# Patient Record
Sex: FEMALE | Race: WHITE | NOT HISPANIC OR LATINO
[De-identification: names, ages, dates, MRNs, and addresses within clinical notes are randomized per-mention and may not be internally consistent; named-entity substitution may affect disease eponyms.]

---

## 2021-07-20 ENCOUNTER — NON-APPOINTMENT (OUTPATIENT)
Age: 56
End: 2021-07-20

## 2021-07-20 ENCOUNTER — APPOINTMENT (OUTPATIENT)
Dept: INTERNAL MEDICINE | Facility: CLINIC | Age: 56
End: 2021-07-20
Payer: COMMERCIAL

## 2021-07-20 VITALS
HEART RATE: 55 BPM | WEIGHT: 191 LBS | OXYGEN SATURATION: 98 % | HEIGHT: 65 IN | TEMPERATURE: 98 F | BODY MASS INDEX: 31.82 KG/M2 | DIASTOLIC BLOOD PRESSURE: 70 MMHG | SYSTOLIC BLOOD PRESSURE: 116 MMHG

## 2021-07-20 DIAGNOSIS — Z78.9 OTHER SPECIFIED HEALTH STATUS: ICD-10-CM

## 2021-07-20 DIAGNOSIS — I46.9 CARDIAC ARREST, CAUSE UNSPECIFIED: ICD-10-CM

## 2021-07-20 DIAGNOSIS — Z83.3 FAMILY HISTORY OF DIABETES MELLITUS: ICD-10-CM

## 2021-07-20 DIAGNOSIS — Z81.8 FAMILY HISTORY OF OTHER MENTAL AND BEHAVIORAL DISORDERS: ICD-10-CM

## 2021-07-20 DIAGNOSIS — Z87.898 PERSONAL HISTORY OF OTHER SPECIFIED CONDITIONS: ICD-10-CM

## 2021-07-20 DIAGNOSIS — R09.89 OTHER SPECIFIED SYMPTOMS AND SIGNS INVOLVING THE CIRCULATORY AND RESPIRATORY SYSTEMS: ICD-10-CM

## 2021-07-20 PROCEDURE — 93000 ELECTROCARDIOGRAM COMPLETE: CPT

## 2021-07-20 PROCEDURE — 99204 OFFICE O/P NEW MOD 45 MIN: CPT | Mod: 25

## 2021-07-22 ENCOUNTER — TRANSCRIPTION ENCOUNTER (OUTPATIENT)
Age: 56
End: 2021-07-22

## 2021-07-22 ENCOUNTER — APPOINTMENT (OUTPATIENT)
Dept: CARDIOLOGY | Facility: CLINIC | Age: 56
End: 2021-07-22
Payer: COMMERCIAL

## 2021-07-22 ENCOUNTER — NON-APPOINTMENT (OUTPATIENT)
Age: 56
End: 2021-07-22

## 2021-07-22 VITALS
HEIGHT: 65 IN | DIASTOLIC BLOOD PRESSURE: 78 MMHG | WEIGHT: 191 LBS | TEMPERATURE: 98.1 F | BODY MASS INDEX: 31.82 KG/M2 | HEART RATE: 56 BPM | SYSTOLIC BLOOD PRESSURE: 119 MMHG | OXYGEN SATURATION: 98 %

## 2021-07-22 PROCEDURE — 99204 OFFICE O/P NEW MOD 45 MIN: CPT

## 2021-07-22 PROCEDURE — 93000 ELECTROCARDIOGRAM COMPLETE: CPT

## 2021-07-22 NOTE — HISTORY OF PRESENT ILLNESS
[FreeTextEntry1] : 55F with CAD/MI/Cardiac arrest presents to establish cardiovascular care\par Sent in by PMD: Dr Jerrica Eckert	\par prev cardiologist: Dr Ambrose fisher [place], now out of network\par \par pt looking to switch providers.\par pt endorses an episode 9/24205, was experiencing CP, states she collapsed at home, doesn’t remember anything, states she was in the ICU at [facility], intubated for 6 days in the CCU, states she received multiple stents, 2 to the LAD. states the next thing she knows she was being discharged from the hospital.\par states she has been following regularly with her prev cardiologist, last tte was 1/21, showing "normal EF"\par \par pt states her brother had a cardiac arrest, MI and passed away.\par \par pt overall feeling well lately, denies CP, SOB at rest or on on exertion. pt denies palpitations, dizziness, syncope, LE edema, orthopnea\par \par \par \par Exercise: was previously going to the gym multiple times a week, recently broke her foot and has been recovering. \par Diet: overall tries to eat healthy. \par Prior cardiac workup: see above (will try to get records)\par last echo 1/21. last stress test was several years ago.\par Recent labs: 7/21: hgb 13.8 tot chol 149 tg 142 hdl 64 ldl 57 Cr 0.88 gfr 73 tsh 3.08 a1c 5.3 \par \par EKG: SR 59, anterior Q waves. 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 Currently post menopause\par Sx hx: none\par Fam hx: B: CAD/MI\par Social hx: lives in [place] with  and son. works as  for a commercial property.  quit TOB 2016 (40 pk yr hx. daily wine. marijuana once a week. \par Meds: asa lipitor 80 coreg 3.125 zetia 10\par Allergies: nkda\par

## 2021-07-22 NOTE — DISCUSSION/SUMMARY
[FreeTextEntry1] : 55F with CAD, hx of MI/Cardiac arrest presents to establish CV care\par \par 1. CAD\par -will try to obtain prev records, pt states normal LVEF\par -cont asa, statin\par -cont coreg\par -pt feeling well, without symptoms at rest or on exertion, euvolemic on exam\par -f/u 6 months unless requires sooner. \par -will discuss plan with pt following review of past med records/testing

## 2021-07-22 NOTE — PHYSICAL EXAM
[Well Developed] : well developed [Well Nourished] : well nourished [No Acute Distress] : no acute distress [Normal Venous Pressure] : normal venous pressure [Normal S1, S2] : normal S1, S2 [No Murmur] : no murmur [Clear Lung Fields] : clear lung fields [Good Air Entry] : good air entry [No Respiratory Distress] : no respiratory distress  [Soft] : abdomen soft [Non Tender] : non-tender [Normal Gait] : normal gait [No Edema] : no edema [Moves all extremities] : moves all extremities [No Focal Deficits] : no focal deficits [Alert and Oriented] : alert and oriented

## 2021-07-25 ENCOUNTER — NON-APPOINTMENT (OUTPATIENT)
Age: 56
End: 2021-07-25

## 2021-07-25 NOTE — HISTORY OF PRESENT ILLNESS
[FreeTextEntry8] : 54 y/o female patient is new to the office presents today to establish care:\par - f/u HTN/hyperlipidemia/CAD s/p stent  - complaint with current medications, previously following with cardiology no longer takes insurance, needs referral for new cardiologist, currently denies any CP/SOB/palpitations/edema, check labs today

## 2021-07-27 ENCOUNTER — TRANSCRIPTION ENCOUNTER (OUTPATIENT)
Age: 56
End: 2021-07-27

## 2021-07-27 LAB
25(OH)D3 SERPL-MCNC: 55.6 NG/ML
ALBUMIN SERPL ELPH-MCNC: 4.6 G/DL
ALP BLD-CCNC: 41 U/L
ALT SERPL-CCNC: 32 U/L
ANION GAP SERPL CALC-SCNC: 9 MMOL/L
APPEARANCE: ABNORMAL
AST SERPL-CCNC: 35 U/L
BACTERIA: NEGATIVE
BASOPHILS # BLD AUTO: 0.07 K/UL
BASOPHILS NFR BLD AUTO: 1 %
BILIRUB SERPL-MCNC: 0.4 MG/DL
BILIRUBIN URINE: NEGATIVE
BLOOD URINE: NEGATIVE
BUN SERPL-MCNC: 9 MG/DL
CALCIUM SERPL-MCNC: 10.1 MG/DL
CHLORIDE SERPL-SCNC: 102 MMOL/L
CHOLEST SERPL-MCNC: 149 MG/DL
CO2 SERPL-SCNC: 27 MMOL/L
COLOR: YELLOW
CREAT SERPL-MCNC: 0.88 MG/DL
CREAT SPEC-SCNC: 87 MG/DL
EOSINOPHIL # BLD AUTO: 0.1 K/UL
EOSINOPHIL NFR BLD AUTO: 1.4 %
ESTIMATED AVERAGE GLUCOSE: 105 MG/DL
GLUCOSE QUALITATIVE U: NEGATIVE
GLUCOSE SERPL-MCNC: 94 MG/DL
HBA1C MFR BLD HPLC: 5.3 %
HCT VFR BLD CALC: 39.9 %
HDLC SERPL-MCNC: 64 MG/DL
HGB BLD-MCNC: 13.8 G/DL
HYALINE CASTS: 0 /LPF
IMM GRANULOCYTES NFR BLD AUTO: 0.1 %
KETONES URINE: NEGATIVE
LDLC SERPL CALC-MCNC: 57 MG/DL
LEUKOCYTE ESTERASE URINE: NEGATIVE
LYMPHOCYTES # BLD AUTO: 2.13 K/UL
LYMPHOCYTES NFR BLD AUTO: 30.6 %
MAGNESIUM SERPL-MCNC: 2 MG/DL
MAN DIFF?: NORMAL
MCHC RBC-ENTMCNC: 32.4 PG
MCHC RBC-ENTMCNC: 34.6 GM/DL
MCV RBC AUTO: 93.7 FL
MICROALBUMIN 24H UR DL<=1MG/L-MCNC: <1.2 MG/DL
MICROALBUMIN/CREAT 24H UR-RTO: NORMAL MG/G
MICROSCOPIC-UA: NORMAL
MONOCYTES # BLD AUTO: 0.73 K/UL
MONOCYTES NFR BLD AUTO: 10.5 %
NEUTROPHILS # BLD AUTO: 3.91 K/UL
NEUTROPHILS NFR BLD AUTO: 56.4 %
NITRITE URINE: NEGATIVE
NONHDLC SERPL-MCNC: 86 MG/DL
PH URINE: 7
PLATELET # BLD AUTO: 270 K/UL
POTASSIUM SERPL-SCNC: 4.9 MMOL/L
PROT SERPL-MCNC: 6.8 G/DL
PROTEIN URINE: NEGATIVE
RBC # BLD: 4.26 M/UL
RBC # FLD: 13.8 %
RED BLOOD CELLS URINE: 1 /HPF
SODIUM SERPL-SCNC: 138 MMOL/L
SPECIFIC GRAVITY URINE: 1.01
SQUAMOUS EPITHELIAL CELLS: 0 /HPF
TRIGL SERPL-MCNC: 142 MG/DL
TSH SERPL-ACNC: 3.08 UIU/ML
UROBILINOGEN URINE: NORMAL
VIT B12 SERPL-MCNC: >2000 PG/ML
WBC # FLD AUTO: 6.95 K/UL
WHITE BLOOD CELLS URINE: 1 /HPF

## 2021-12-06 ENCOUNTER — NON-APPOINTMENT (OUTPATIENT)
Age: 56
End: 2021-12-06

## 2021-12-06 ENCOUNTER — APPOINTMENT (OUTPATIENT)
Dept: CARDIOLOGY | Facility: CLINIC | Age: 56
End: 2021-12-06
Payer: COMMERCIAL

## 2021-12-06 VITALS
OXYGEN SATURATION: 97 % | SYSTOLIC BLOOD PRESSURE: 106 MMHG | BODY MASS INDEX: 31.65 KG/M2 | HEART RATE: 69 BPM | HEIGHT: 65 IN | TEMPERATURE: 98.7 F | WEIGHT: 190 LBS | RESPIRATION RATE: 16 BRPM | DIASTOLIC BLOOD PRESSURE: 70 MMHG

## 2021-12-06 PROCEDURE — 93000 ELECTROCARDIOGRAM COMPLETE: CPT

## 2021-12-06 PROCEDURE — 99215 OFFICE O/P EST HI 40 MIN: CPT

## 2021-12-06 NOTE — DISCUSSION/SUMMARY
[FreeTextEntry1] : 56F with CAD/MI/Cardiac arrest presents for f/u. \par \par 55F with CAD, hx of MI/Cardiac arrest presents to establish CV care\par \par 1. CAD\par -no cp or sob at rest or on exertion\par -cont asa, statin\par -cont coreg\par -pt feeling well, without symptoms at rest or on exertion, euvolemic on exam\par -will check tte to eval EF\par \par 2. cataract procedure\par -pt states she can walk multiple blocks without cp or sob\par -no recent syncope (prev hx and details of cardiac arrest unclear)\par -no murmurs on exam, pt grossly euvolemic. \par -pt is intermediate risk for low risk procedure.\par -further comments pending repeat echo.\par \par f/u 6 months unless requires sooner.

## 2021-12-06 NOTE — HISTORY OF PRESENT ILLNESS
[FreeTextEntry1] : 56F with CAD/MI/Cardiac arrest presents for f/u. \par Sent in by PMD: Dr Jerrica Eckert	\par prev cardiologist: Dr Ambrose fisher [place], now out of network\par previosuly,\par pt last seen in cardiology for an initial eval 7/21 following an episode 9/24/20, was experiencing CP, states she collapsed at home, doesn’t remember anything, states she was in the ICU at [facility], intubated for 6 days in the CCU, states she received multiple stents, 2 to the LAD. states the next thing she knows she was being discharged from the hospital.\par states she has been following regularly with her prev cardiologist, last tte was 1/21, showing "normal EF"\par pt states her brother had a cardiac arrest, MI and passed away.\par \par At last appt, pt was feeling well overall. no issues. OSH records showing preserved LVEF. \par pt now presents for follow up.\par today,\par \par pt overall feeling well, denies cp sob, at rest or on exertion. pt denies palpitations, dizziness, syncope, LE edema, orthopnea. \par \par pt planning on cataract surgery. \par pt states she can walk multiple blocks without cp or sob\par no recent syncope\par no murmurs on exam, pt grossly euvoemic. \par \par \par \par Exercise: trying to start again. \par Diet: overall tries to eat healthy. \par Prior cardiac workup: see above (will try to get records)\par last echo 1/21. last stress test was several years ago.\par Recent labs: 7/21: hgb 13.8 tot chol 149 tg 142 hdl 64 ldl 57 Cr 0.88 gfr 73 tsh 3.08 a1c 5.3 \par \par EKG: SR 60. ant Q waves. 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 Currently post menopause\par Sx hx: none\par Fam hx: B: CAD/MI\par Social hx: lives in [place] with  and son. works as  for a commercial property. quit TOB 2016 (40 pk yr hx. daily wine. marijuana once a week. \par Meds: asa lipitor 80 coreg 3.125 zetia 10, vitamins\par Allergies: nkda\par

## 2021-12-22 ENCOUNTER — APPOINTMENT (OUTPATIENT)
Dept: INTERNAL MEDICINE | Facility: CLINIC | Age: 56
End: 2021-12-22
Payer: COMMERCIAL

## 2021-12-22 VITALS
SYSTOLIC BLOOD PRESSURE: 125 MMHG | DIASTOLIC BLOOD PRESSURE: 79 MMHG | RESPIRATION RATE: 16 BRPM | HEART RATE: 71 BPM | OXYGEN SATURATION: 97 % | WEIGHT: 191.6 LBS | HEIGHT: 65 IN | BODY MASS INDEX: 31.92 KG/M2 | TEMPERATURE: 98.2 F

## 2021-12-22 PROCEDURE — 99214 OFFICE O/P EST MOD 30 MIN: CPT

## 2021-12-30 NOTE — HISTORY OF PRESENT ILLNESS
[Coronary Artery Disease] : coronary artery disease [No Pertinent Pulmonary History] : no history of asthma, COPD, sleep apnea, or smoking [No Adverse Anesthesia Reaction] : no adverse anesthesia reaction in self or family member [(Patient denies any chest pain, claudication, dyspnea on exertion, orthopnea, palpitations or syncope)] : Patient denies any chest pain, claudication, dyspnea on exertion, orthopnea, palpitations or syncope [Good (7-10 METs)] : Good (7-10 METs) [Aortic Stenosis] : no aortic stenosis [Atrial Fibrillation] : no atrial fibrillation [Recent Myocardial Infarction] : no recent myocardial infarction [Implantable Device/Pacemaker] : no implantable device/pacemaker [Chronic Anticoagulation] : no chronic anticoagulation [Chronic Kidney Disease] : no chronic kidney disease [Diabetes] : no diabetes [FreeTextEntry1] : Left Cataract Repair  [FreeTextEntry2] : 1/4/2022 [FreeTextEntry3] : Dr. Grabiel Xiong  [FreeTextEntry4] : 55 y/o female patient presents today:\par - Pre-operative clearance - patient to undergo left cataract extraction with insertion of intraocular lens on 1/4/2022 by Dr. Xiong and [facility]

## 2022-01-25 ENCOUNTER — APPOINTMENT (OUTPATIENT)
Dept: CARDIOLOGY | Facility: CLINIC | Age: 57
End: 2022-01-25

## 2022-01-26 ENCOUNTER — APPOINTMENT (OUTPATIENT)
Dept: CARDIOLOGY | Facility: CLINIC | Age: 57
End: 2022-01-26
Payer: COMMERCIAL

## 2022-01-26 PROCEDURE — 93306 TTE W/DOPPLER COMPLETE: CPT

## 2022-03-17 DIAGNOSIS — K21.9 GASTRO-ESOPHAGEAL REFLUX DISEASE W/OUT ESOPHAGITIS: ICD-10-CM

## 2022-07-01 ENCOUNTER — RX RENEWAL (OUTPATIENT)
Age: 57
End: 2022-07-01

## 2022-07-17 ENCOUNTER — RX RENEWAL (OUTPATIENT)
Age: 57
End: 2022-07-17

## 2022-07-21 ENCOUNTER — APPOINTMENT (OUTPATIENT)
Dept: NEUROLOGY | Facility: CLINIC | Age: 57
End: 2022-07-21

## 2022-07-27 ENCOUNTER — APPOINTMENT (OUTPATIENT)
Dept: INTERNAL MEDICINE | Facility: CLINIC | Age: 57
End: 2022-07-27
Payer: COMMERCIAL

## 2022-07-27 VITALS
TEMPERATURE: 97.8 F | HEART RATE: 66 BPM | OXYGEN SATURATION: 98 % | WEIGHT: 189 LBS | RESPIRATION RATE: 16 BRPM | DIASTOLIC BLOOD PRESSURE: 70 MMHG | SYSTOLIC BLOOD PRESSURE: 100 MMHG | BODY MASS INDEX: 31.49 KG/M2 | HEIGHT: 65 IN

## 2022-07-27 DIAGNOSIS — G89.29 PAIN IN RIGHT SHOULDER: ICD-10-CM

## 2022-07-27 DIAGNOSIS — D22.9 MELANOCYTIC NEVI, UNSPECIFIED: ICD-10-CM

## 2022-07-27 DIAGNOSIS — Z01.818 ENCOUNTER FOR OTHER PREPROCEDURAL EXAMINATION: ICD-10-CM

## 2022-07-27 DIAGNOSIS — M25.511 PAIN IN RIGHT SHOULDER: ICD-10-CM

## 2022-07-27 DIAGNOSIS — H26.9 UNSPECIFIED CATARACT: ICD-10-CM

## 2022-07-27 DIAGNOSIS — M25.512 PAIN IN RIGHT SHOULDER: ICD-10-CM

## 2022-07-27 PROCEDURE — 36415 COLL VENOUS BLD VENIPUNCTURE: CPT

## 2022-07-27 PROCEDURE — G0444 DEPRESSION SCREEN ANNUAL: CPT | Mod: 59

## 2022-07-27 PROCEDURE — 99396 PREV VISIT EST AGE 40-64: CPT | Mod: 25

## 2022-08-01 PROBLEM — D22.9 MULTIPLE NEVI: Status: ACTIVE | Noted: 2022-07-27

## 2022-08-01 RX ORDER — METHYLPREDNISOLONE 4 MG/1
4 TABLET ORAL
Qty: 21 | Refills: 0 | Status: DISCONTINUED | COMMUNITY
Start: 2022-04-12 | End: 2022-08-01

## 2022-08-01 NOTE — HEALTH RISK ASSESSMENT
[Former] : Former [20 or more] : 20 or more [Yes] : Yes [No] : In the past 12 months have you used drugs other than those required for medical reasons? No [0] : 2) Feeling down, depressed, or hopeless: Not at all (0) [PHQ-2 Negative - No further assessment needed] : PHQ-2 Negative - No further assessment needed [I have developed a follow-up plan documented below in the note.] : I have developed a follow-up plan documented below in the note. [de-identified] : 1.5 ppd x 38 years, QUIT age 50 [[id number]] : 0 [Patient reported mammogram was normal] : Patient reported mammogram was normal [Patient reported PAP Smear was normal] : Patient reported PAP Smear was normal [Patient reported bone density results were normal] : Patient reported bone density results were normal [Patient reported colonoscopy was normal] : Patient reported colonoscopy was normal [Change in mental status noted] : No change in mental status noted [None] : None [With Family] : lives with family [Employed] : employed [] :  [Sexually Active] : sexually active [High Risk Behavior] : no high risk behavior [Feels Safe at Home] : Feels safe at home [Reports changes in hearing] : Reports no changes in hearing [Reports changes in vision] : Reports no changes in vision [Reports changes in dental health] : Reports no changes in dental health [MammogramDate] : 2020 [PapSmearDate] : 2020 [BoneDensityDate] : 2020 [ColonoscopyDate] : 2018

## 2022-08-01 NOTE — HISTORY OF PRESENT ILLNESS
[de-identified] : 57 y/o female patient presents today for annual CPE/fasting labs\par - HCM: - PAP - 2020, mammogram - 2020 WNL, colonoscopy - 2018 WNL repeat in 5 years, DEXA - 2020 normal\par - f/u HTN/CAD/hyperlipidemia/hx MI/s/p stent - BP stable, following with cardiology, denies any CP/SOB/Palpitations/edema\par

## 2022-08-02 ENCOUNTER — APPOINTMENT (OUTPATIENT)
Dept: CARDIOLOGY | Facility: CLINIC | Age: 57
End: 2022-08-02
Payer: COMMERCIAL

## 2022-08-02 ENCOUNTER — NON-APPOINTMENT (OUTPATIENT)
Age: 57
End: 2022-08-02

## 2022-08-02 VITALS
HEART RATE: 63 BPM | HEIGHT: 65 IN | SYSTOLIC BLOOD PRESSURE: 108 MMHG | DIASTOLIC BLOOD PRESSURE: 80 MMHG | RESPIRATION RATE: 16 BRPM | WEIGHT: 187 LBS | OXYGEN SATURATION: 98 % | BODY MASS INDEX: 31.16 KG/M2 | TEMPERATURE: 97 F

## 2022-08-02 PROCEDURE — 99215 OFFICE O/P EST HI 40 MIN: CPT | Mod: 25

## 2022-08-02 PROCEDURE — 93000 ELECTROCARDIOGRAM COMPLETE: CPT

## 2022-08-02 PROCEDURE — 99406 BEHAV CHNG SMOKING 3-10 MIN: CPT | Mod: 25

## 2022-08-02 NOTE — HISTORY OF PRESENT ILLNESS
[FreeTextEntry1] : 56F with CAD/MI/Cardiac arrest presents for f/u. \par PMD: Dr Jerrica Eckert	\par prev cardiologist: Dr Ambrose fisher [place], now out of network\par \par previously,\par pt last seen in cardiology for an initial eval 7/21 following an episode 9/24/20, was experiencing CP, states she collapsed at home, doesn’t remember anything, states she was in the ICU at [facility], intubated for 6 days in the CCU, states she received multiple stents, 2 to the LAD. states the next thing she knows she was being discharged from the hospital.  pt reported previous normal echos at outpt primary cards (most recent 1/21)\par \par pt last seen 12/2021, at that time feeling well. repeat tte  1/2022, showing normal LV systolic function. normal RV. aortic root 4.4 cm. \par \par pt now presents for follow up.\par today,\par \par pt overall feeling well, no issues. walking daily 2 miles. denies cp or sob at rest or on exertion. no recent hospitalizations.  pt denies palpitations, dizziness, syncope, LE edema, orthopnea. \par \par pt had cataract surgery, tolerated procedure without cv issues.\par \par Exercise: walking daily\par Diet: overall tries to eat healthy. \par Prior cardiac workup: see above (will try to get records)\par last echo 1/22: aorta 4.4 cm\par Recent labs: 7/21: hgb 13.8 tot chol 149 tg 142 hdl 64 ldl 57 Cr 0.88 gfr 73 tsh 3.08 a1c 5.3 \par \par EKG: SR 60. ant Q waves. 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 Currently post menopause\par Sx hx: none\par Fam hx: B: CAD/MI/SCD\par Social hx: lives in [place] with  and son. works as  for a commercial property. quit TOB 2016 (40 pk yr hx. daily wine. marijuana once a week. \par Meds: asa lipitor 80 coreg 3.125 zetia 10, vitamins\par Allergies: nkda\par

## 2022-08-02 NOTE — DISCUSSION/SUMMARY
[FreeTextEntry1] : 56F with CAD/MI/Cardiac arrest/PCI, dilated aorta presents for f/u. \par \par \par 1. CAD\par -no cp or sob at rest or on exertion\par -cont asa, statin, coreg\par -pt feeling well, without symptoms at rest or on exertion, euvolemic on exam\par \par \par 3. dilated aorta\par -4.4 cm at last echo\par -will repeat echo today to assess for interval change\par -bp controlled\par -pt advised to stop smoking marijuana\par \par f/u 6 months unless requires sooner. \par \par  \par

## 2022-08-02 NOTE — COUNSELING
[Yes] : Risk of tobacco use and health benefits of smoking cessation discussed: Yes [Cessation strategies including cessation program discussed] : Cessation strategies including cessation program discussed [Use of nicotine replacement therapies and other medications discussed] : Use of nicotine replacement therapies and other medications discussed [Encouraged to pick a quit date and identify support needed to quit] : Encouraged to pick a quit date and identify support needed to quit [FreeTextEntry1] : 5

## 2022-08-23 ENCOUNTER — APPOINTMENT (OUTPATIENT)
Dept: CARDIOLOGY | Facility: CLINIC | Age: 57
End: 2022-08-23
Payer: COMMERCIAL

## 2022-08-23 PROCEDURE — 93306 TTE W/DOPPLER COMPLETE: CPT

## 2022-11-06 LAB
25(OH)D3 SERPL-MCNC: 72.2 NG/ML
ALBUMIN SERPL ELPH-MCNC: 5 G/DL
ALP BLD-CCNC: 45 U/L
ALT SERPL-CCNC: 35 U/L
ANION GAP SERPL CALC-SCNC: 13 MMOL/L
APPEARANCE: CLEAR
AST SERPL-CCNC: 36 U/L
BACTERIA: NEGATIVE
BASOPHILS # BLD AUTO: 0.05 K/UL
BASOPHILS NFR BLD AUTO: 0.8 %
BILIRUB SERPL-MCNC: 0.9 MG/DL
BILIRUBIN URINE: NEGATIVE
BLOOD URINE: NEGATIVE
BUN SERPL-MCNC: 15 MG/DL
CALCIUM SERPL-MCNC: 10 MG/DL
CHLORIDE SERPL-SCNC: 102 MMOL/L
CHOLEST SERPL-MCNC: 151 MG/DL
CO2 SERPL-SCNC: 27 MMOL/L
COLOR: COLORLESS
CREAT SERPL-MCNC: 0.93 MG/DL
CREAT SPEC-SCNC: 20 MG/DL
EGFR: 71 ML/MIN/1.73M2
EOSINOPHIL # BLD AUTO: 0.12 K/UL
EOSINOPHIL NFR BLD AUTO: 2 %
ESTIMATED AVERAGE GLUCOSE: 111 MG/DL
GLUCOSE QUALITATIVE U: NEGATIVE
GLUCOSE SERPL-MCNC: 101 MG/DL
HBA1C MFR BLD HPLC: 5.5 %
HCT VFR BLD CALC: 44 %
HDLC SERPL-MCNC: 63 MG/DL
HGB BLD-MCNC: 14.1 G/DL
HYALINE CASTS: 0 /LPF
IMM GRANULOCYTES NFR BLD AUTO: 0.2 %
IRON SATN MFR SERPL: 30 %
IRON SERPL-MCNC: 103 UG/DL
KETONES URINE: NEGATIVE
LDLC SERPL CALC-MCNC: 72 MG/DL
LEUKOCYTE ESTERASE URINE: NEGATIVE
LYMPHOCYTES # BLD AUTO: 1.9 K/UL
LYMPHOCYTES NFR BLD AUTO: 31.9 %
MAGNESIUM SERPL-MCNC: 2.2 MG/DL
MAN DIFF?: NORMAL
MCHC RBC-ENTMCNC: 31.5 PG
MCHC RBC-ENTMCNC: 32 GM/DL
MCV RBC AUTO: 98.4 FL
MICROALBUMIN 24H UR DL<=1MG/L-MCNC: <1.2 MG/DL
MICROALBUMIN/CREAT 24H UR-RTO: NORMAL MG/G
MICROSCOPIC-UA: NORMAL
MONOCYTES # BLD AUTO: 0.53 K/UL
MONOCYTES NFR BLD AUTO: 8.9 %
NEUTROPHILS # BLD AUTO: 3.35 K/UL
NEUTROPHILS NFR BLD AUTO: 56.2 %
NITRITE URINE: NEGATIVE
NONHDLC SERPL-MCNC: 88 MG/DL
PH URINE: 7
PLATELET # BLD AUTO: 245 K/UL
POTASSIUM SERPL-SCNC: 4.6 MMOL/L
PROT SERPL-MCNC: 7.4 G/DL
PROTEIN URINE: NEGATIVE
RBC # BLD: 4.47 M/UL
RBC # FLD: 14.1 %
RED BLOOD CELLS URINE: 0 /HPF
SODIUM SERPL-SCNC: 142 MMOL/L
SPECIFIC GRAVITY URINE: 1.01
SQUAMOUS EPITHELIAL CELLS: 0 /HPF
TIBC SERPL-MCNC: 344 UG/DL
TRIGL SERPL-MCNC: 77 MG/DL
TSH SERPL-ACNC: 1.47 UIU/ML
UIBC SERPL-MCNC: 241 UG/DL
UROBILINOGEN URINE: NORMAL
WBC # FLD AUTO: 5.96 K/UL
WHITE BLOOD CELLS URINE: 0 /HPF

## 2022-11-21 ENCOUNTER — RX RENEWAL (OUTPATIENT)
Age: 57
End: 2022-11-21

## 2022-12-12 ENCOUNTER — RX RENEWAL (OUTPATIENT)
Age: 57
End: 2022-12-12

## 2023-01-30 ENCOUNTER — RX RENEWAL (OUTPATIENT)
Age: 58
End: 2023-01-30

## 2023-02-08 ENCOUNTER — NON-APPOINTMENT (OUTPATIENT)
Age: 58
End: 2023-02-08

## 2023-02-08 ENCOUNTER — APPOINTMENT (OUTPATIENT)
Dept: CARDIOLOGY | Facility: CLINIC | Age: 58
End: 2023-02-08
Payer: COMMERCIAL

## 2023-02-08 VITALS
BODY MASS INDEX: 33.12 KG/M2 | SYSTOLIC BLOOD PRESSURE: 120 MMHG | HEART RATE: 62 BPM | HEIGHT: 64 IN | WEIGHT: 194 LBS | TEMPERATURE: 98 F | OXYGEN SATURATION: 98 % | DIASTOLIC BLOOD PRESSURE: 74 MMHG

## 2023-02-08 PROCEDURE — 93000 ELECTROCARDIOGRAM COMPLETE: CPT

## 2023-02-08 PROCEDURE — 99215 OFFICE O/P EST HI 40 MIN: CPT

## 2023-02-08 PROCEDURE — 99406 BEHAV CHNG SMOKING 3-10 MIN: CPT

## 2023-02-08 NOTE — DISCUSSION/SUMMARY
[FreeTextEntry1] : 57F with CAD/MI/Cardiac arrest/PCI, dilated aorta presents for f/u. \par \par \par 1. CAD\par -no cp or sob at rest or on exertion\par -cont asa, statin, coreg\par -pt feeling well, without symptoms at rest or on exertion, euvolemic on exam\par \par \par 3. dilated aorta\par -4.4 cm at last echo\par -will repeat echo at next visit 8/23 to assess for interval change\par -will check abd u/s as well to eval abd aorta\par -bp controlled\par -pt advised to stop smoking marijuana\par \par f/u 8/23 unless requires sooner. \par \par  >45 min spent on complete encounter\par  [EKG obtained to assist in diagnosis and management of assessed problem(s)] : EKG obtained to assist in diagnosis and management of assessed problem(s)

## 2023-02-08 NOTE — COUNSELING

## 2023-02-08 NOTE — HISTORY OF PRESENT ILLNESS
[FreeTextEntry1] : 57F with CAD/MI/Cardiac arrest, PCI to LAD, aortic root 4.4 cm presents for f/u. \par PMD: Dr Jerrica Eckert	\par prev cardiologist: Dr Ambrose fisher [place], now out of network\par \par previously,\par pt seen in cardiology for an initial eval 7/21 following an episode 9/24/20, was experiencing CP, states she collapsed at home, doesn’t remember anything, states she was in the ICU at [facility], intubated for 6 days in the CCU, states she received multiple stents, 2 to the LAD. states the next thing she knows she was being discharged from the hospital. pt reported previous normal echos at outpt primary cards (most recent 1/21)\par 12/2021, at that time feeling well. repeat tte 1/2022, showing normal LV systolic function. normal RV. aortic root 4.4 cm. \par pt last seen 8/22, feeling well. repeat tte showing aorta unchanged at 4.4 cm. \par \par \par pt now presents for follow up.\par today,\par \par pt overall feeling well, no issues. walking daily 2 miles, without symptoms. denies cp or sob at rest or on exertion. no recent hospitalizations. pt denies palpitations, dizziness, syncope, LE edema, orthopnea. \par \par pt had cataract surgery, tolerated procedure without cv issues.\par \par Exercise: walking daily\par Diet: overall tries to eat healthy. \par Prior cardiac workup: see above (will try to get records)\par last echo 1/22: aorta 4.4 cm\par Recent labs: 7/21: hgb 13.8 tot chol 149 tg 142 hdl 64 ldl 57 Cr 0.88 gfr 73 tsh 3.08 a1c 5.3 \par \par EKG: SR 56. ant Q waves. 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 Currently post menopause\par Sx hx: none\par Fam hx: B: CAD/MI/SCD\par Social hx: lives in [place] with  and son. works as  for a commercial property. quit TOB 2016 (40 pk yr hx. daily wine. marijuana once a week. \par Meds: asa lipitor 80 coreg 3.125 zetia 10, vitamins\par Allergies: nkda\par

## 2023-04-12 ENCOUNTER — APPOINTMENT (OUTPATIENT)
Dept: INTERNAL MEDICINE | Facility: CLINIC | Age: 58
End: 2023-04-12
Payer: COMMERCIAL

## 2023-04-12 VITALS
SYSTOLIC BLOOD PRESSURE: 103 MMHG | TEMPERATURE: 98.4 F | WEIGHT: 190.44 LBS | RESPIRATION RATE: 16 BRPM | BODY MASS INDEX: 32.51 KG/M2 | HEIGHT: 64 IN | DIASTOLIC BLOOD PRESSURE: 71 MMHG | HEART RATE: 63 BPM | OXYGEN SATURATION: 96 %

## 2023-04-12 DIAGNOSIS — E78.2 MIXED HYPERLIPIDEMIA: ICD-10-CM

## 2023-04-12 PROCEDURE — 99214 OFFICE O/P EST MOD 30 MIN: CPT | Mod: 25

## 2023-04-12 NOTE — HISTORY OF PRESENT ILLNESS
[de-identified] : 56 y/o female patient presents today:\par - f/u HTN/CAD/hyperlipidemia/s/p stent/dilated aortic root - patient following with cardiology seen in February, appointment for abd US and Echo, BP well controlled, denies any CP/SOB/Palpitations/edema\par

## 2023-04-30 ENCOUNTER — RX RENEWAL (OUTPATIENT)
Age: 58
End: 2023-04-30

## 2023-07-20 ENCOUNTER — APPOINTMENT (OUTPATIENT)
Dept: CARDIOLOGY | Facility: CLINIC | Age: 58
End: 2023-07-20

## 2023-08-20 ENCOUNTER — RX RENEWAL (OUTPATIENT)
Age: 58
End: 2023-08-20

## 2023-08-21 RX ORDER — FAMOTIDINE 20 MG/1
20 TABLET, FILM COATED ORAL
Qty: 30 | Refills: 3 | Status: ACTIVE | COMMUNITY
Start: 2022-03-17 | End: 1900-01-01

## 2023-08-22 LAB
ALBUMIN SERPL ELPH-MCNC: 4.7 G/DL
ALP BLD-CCNC: 43 U/L
ALT SERPL-CCNC: 34 U/L
ANION GAP SERPL CALC-SCNC: 9 MMOL/L
APPEARANCE: CLEAR
AST SERPL-CCNC: 36 U/L
BACTERIA: NEGATIVE
BILIRUB SERPL-MCNC: 0.6 MG/DL
BILIRUBIN URINE: NEGATIVE
BLOOD URINE: NEGATIVE
BUN SERPL-MCNC: 13 MG/DL
CALCIUM SERPL-MCNC: 10.4 MG/DL
CHLORIDE SERPL-SCNC: 102 MMOL/L
CHOLEST SERPL-MCNC: 149 MG/DL
CO2 SERPL-SCNC: 28 MMOL/L
COLOR: YELLOW
CREAT SERPL-MCNC: 0.92 MG/DL
CREAT SPEC-SCNC: 56 MG/DL
EGFR: 72 ML/MIN/1.73M2
ESTIMATED AVERAGE GLUCOSE: 114 MG/DL
GLUCOSE QUALITATIVE U: NEGATIVE
GLUCOSE SERPL-MCNC: 113 MG/DL
HBA1C MFR BLD HPLC: 5.6 %
HCT VFR BLD CALC: 41 %
HDLC SERPL-MCNC: 66 MG/DL
HGB BLD-MCNC: 13.7 G/DL
HYALINE CASTS: 0 /LPF
KETONES URINE: NEGATIVE
LDLC SERPL CALC-MCNC: 61 MG/DL
LEUKOCYTE ESTERASE URINE: ABNORMAL
MCHC RBC-ENTMCNC: 31.1 PG
MCHC RBC-ENTMCNC: 33.4 GM/DL
MCV RBC AUTO: 93 FL
MICROALBUMIN 24H UR DL<=1MG/L-MCNC: <1.2 MG/DL
MICROALBUMIN/CREAT 24H UR-RTO: NORMAL MG/G
MICROSCOPIC-UA: NORMAL
NITRITE URINE: NEGATIVE
NONHDLC SERPL-MCNC: 83 MG/DL
PH URINE: 7
PLATELET # BLD AUTO: 234 K/UL
POTASSIUM SERPL-SCNC: 4.4 MMOL/L
PROT SERPL-MCNC: 6.9 G/DL
PROTEIN URINE: NEGATIVE
RBC # BLD: 4.41 M/UL
RBC # FLD: 13.7 %
RED BLOOD CELLS URINE: 2 /HPF
SODIUM SERPL-SCNC: 139 MMOL/L
SPECIFIC GRAVITY URINE: 1.01
SQUAMOUS EPITHELIAL CELLS: 2 /HPF
TRIGL SERPL-MCNC: 107 MG/DL
TSH SERPL-ACNC: 2.26 UIU/ML
UROBILINOGEN URINE: NORMAL
WBC # FLD AUTO: 6.51 K/UL
WHITE BLOOD CELLS URINE: 26 /HPF

## 2023-08-28 ENCOUNTER — APPOINTMENT (OUTPATIENT)
Dept: CARDIOLOGY | Facility: CLINIC | Age: 58
End: 2023-08-28

## 2023-09-03 ENCOUNTER — RX RENEWAL (OUTPATIENT)
Age: 58
End: 2023-09-03

## 2023-09-12 ENCOUNTER — NON-APPOINTMENT (OUTPATIENT)
Age: 58
End: 2023-09-12

## 2023-10-08 ENCOUNTER — NON-APPOINTMENT (OUTPATIENT)
Age: 58
End: 2023-10-08

## 2023-10-18 ENCOUNTER — APPOINTMENT (OUTPATIENT)
Dept: INTERNAL MEDICINE | Facility: CLINIC | Age: 58
End: 2023-10-18
Payer: COMMERCIAL

## 2023-10-18 VITALS
SYSTOLIC BLOOD PRESSURE: 103 MMHG | BODY MASS INDEX: 32.27 KG/M2 | HEIGHT: 64 IN | OXYGEN SATURATION: 97 % | WEIGHT: 189 LBS | HEART RATE: 60 BPM | TEMPERATURE: 98 F | DIASTOLIC BLOOD PRESSURE: 64 MMHG

## 2023-10-18 DIAGNOSIS — I25.10 ATHEROSCLEROTIC HEART DISEASE OF NATIVE CORONARY ARTERY W/OUT ANGINA PECTORIS: ICD-10-CM

## 2023-10-18 DIAGNOSIS — I25.2 OLD MYOCARDIAL INFARCTION: ICD-10-CM

## 2023-10-18 DIAGNOSIS — I10 ESSENTIAL (PRIMARY) HYPERTENSION: ICD-10-CM

## 2023-10-18 DIAGNOSIS — Z95.5 PRESENCE OF CORONARY ANGIOPLASTY IMPLANT AND GRAFT: ICD-10-CM

## 2023-10-18 DIAGNOSIS — F41.1 GENERALIZED ANXIETY DISORDER: ICD-10-CM

## 2023-10-18 DIAGNOSIS — Z00.00 ENCOUNTER FOR GENERAL ADULT MEDICAL EXAMINATION W/OUT ABNORMAL FINDINGS: ICD-10-CM

## 2023-10-18 DIAGNOSIS — Z86.16 PERSONAL HISTORY OF COVID-19: ICD-10-CM

## 2023-10-18 DIAGNOSIS — E66.09 OTHER OBESITY DUE TO EXCESS CALORIES: ICD-10-CM

## 2023-10-18 DIAGNOSIS — Z87.891 PERSONAL HISTORY OF NICOTINE DEPENDENCE: ICD-10-CM

## 2023-10-18 PROCEDURE — 99396 PREV VISIT EST AGE 40-64: CPT | Mod: 25

## 2023-10-18 RX ORDER — CLONAZEPAM 0.5 MG/1
0.5 TABLET ORAL
Refills: 0 | Status: DISCONTINUED | COMMUNITY
End: 2023-10-18

## 2023-10-18 RX ORDER — MELOXICAM 15 MG/1
15 TABLET ORAL
Qty: 30 | Refills: 0 | Status: DISCONTINUED | COMMUNITY
Start: 2022-04-12 | End: 2023-10-18

## 2023-10-18 RX ORDER — ASPIRIN 81 MG
81 TABLET, DELAYED RELEASE (ENTERIC COATED) ORAL
Refills: 0 | Status: ACTIVE | COMMUNITY

## 2023-11-16 ENCOUNTER — APPOINTMENT (OUTPATIENT)
Dept: INTERNAL MEDICINE | Facility: CLINIC | Age: 58
End: 2023-11-16

## 2023-11-28 ENCOUNTER — RX RENEWAL (OUTPATIENT)
Age: 58
End: 2023-11-28

## 2023-12-03 PROBLEM — Z87.891 FORMER SMOKER: Status: ACTIVE | Noted: 2021-07-20

## 2023-12-03 PROBLEM — E66.09 OTHER OBESITY DUE TO EXCESS CALORIES: Status: ACTIVE | Noted: 2021-07-25

## 2023-12-03 PROBLEM — F41.1 GAD (GENERALIZED ANXIETY DISORDER): Status: ACTIVE | Noted: 2021-07-20

## 2023-12-03 PROBLEM — I10 BENIGN ESSENTIAL HYPERTENSION: Status: ACTIVE | Noted: 2021-07-20

## 2023-12-04 ENCOUNTER — APPOINTMENT (OUTPATIENT)
Dept: INTERNAL MEDICINE | Facility: CLINIC | Age: 58
End: 2023-12-04
Payer: COMMERCIAL

## 2023-12-04 PROCEDURE — 90750 HZV VACC RECOMBINANT IM: CPT

## 2023-12-04 PROCEDURE — 90471 IMMUNIZATION ADMIN: CPT

## 2023-12-08 ENCOUNTER — RX RENEWAL (OUTPATIENT)
Age: 58
End: 2023-12-08

## 2023-12-08 RX ORDER — CARVEDILOL 3.12 MG/1
3.12 TABLET, FILM COATED ORAL
Qty: 180 | Refills: 1 | Status: ACTIVE | COMMUNITY
Start: 2022-07-01 | End: 1900-01-01

## 2023-12-14 ENCOUNTER — NON-APPOINTMENT (OUTPATIENT)
Age: 58
End: 2023-12-14

## 2023-12-14 ENCOUNTER — APPOINTMENT (OUTPATIENT)
Dept: CARDIOLOGY | Facility: CLINIC | Age: 58
End: 2023-12-14
Payer: COMMERCIAL

## 2023-12-14 VITALS
TEMPERATURE: 98.6 F | SYSTOLIC BLOOD PRESSURE: 112 MMHG | BODY MASS INDEX: 32.78 KG/M2 | HEIGHT: 64 IN | DIASTOLIC BLOOD PRESSURE: 74 MMHG | OXYGEN SATURATION: 97 % | RESPIRATION RATE: 16 BRPM | HEART RATE: 70 BPM | WEIGHT: 192 LBS

## 2023-12-14 DIAGNOSIS — Z98.61 ATHEROSCLEROTIC HEART DISEASE OF NATIVE CORONARY ARTERY W/OUT ANGINA PECTORIS: ICD-10-CM

## 2023-12-14 DIAGNOSIS — I25.10 ATHEROSCLEROTIC HEART DISEASE OF NATIVE CORONARY ARTERY W/OUT ANGINA PECTORIS: ICD-10-CM

## 2023-12-14 DIAGNOSIS — I77.810 THORACIC AORTIC ECTASIA: ICD-10-CM

## 2023-12-14 PROCEDURE — 99406 BEHAV CHNG SMOKING 3-10 MIN: CPT

## 2023-12-14 PROCEDURE — 99215 OFFICE O/P EST HI 40 MIN: CPT | Mod: 25

## 2023-12-14 PROCEDURE — 93000 ELECTROCARDIOGRAM COMPLETE: CPT

## 2023-12-14 NOTE — DISCUSSION/SUMMARY
[FreeTextEntry1] : 58F with CAD/MI/Cardiac arrest/PCI, dilated aorta presents for f/u.   1. CAD -no cp or sob at rest or on exertion -cont asa, statin, coreg -pt feeling well, without symptoms at rest or on exertion, euvolemic on exam   2. dilated aorta -4.4 cm at last echo -will repeat echo and abd u/s as well to eval abd aorta -bp controlled -pt advised to stop smoking marijuana  f/u 6 months unless requires sooner.   >40 min spent on complete encounter  [EKG obtained to assist in diagnosis and management of assessed problem(s)] : EKG obtained to assist in diagnosis and management of assessed problem(s)

## 2023-12-14 NOTE — HISTORY OF PRESENT ILLNESS
[FreeTextEntry1] : 58F with CAD/MI/Cardiac arrest, PCI to LAD, aortic root 4.4 cm presents for f/u.  PMD: Dr Jerrica Eckert	 prev cardiologist: Dr Ambrose fisher [place], now out of network  previously, pt seen in cardiology for an initial eval 7/21 following an episode 9/24/20, was experiencing CP, states she collapsed at home, doesnAA?A't remember anything, states she was in the ICU at [facility], intubated for 6 days in the CCU, states she received multiple stents, 2 to the LAD. states the next thing she knows she was being discharged from the hospital. pt reported previous normal echos at outpt primary cards (most recent 1/21) 12/2021, at that time feeling well. repeat tte 1/2022, showing normal LV systolic function. normal RV. aortic root 4.4 cm.  8/22, feeling well. repeat tte showing aorta unchanged at 4.4 cm.  pt last seen 2/23, feeling well. exercising daily.   pt now presents for follow up. today,  pt overall feeling well, no issues. walking daily 2 miles, without symptoms. denies cp or sob at rest or on exertion. no recent hospitalizations. pt denies palpitations, dizziness, syncope, LE edema, orthopnea.   needs tte, AAA study. ready to scheule.  still smoking marijuana occasionally checks bp at home, states t has always been fine.   Exercise: walking daily Diet: overall tries to eat healthy.  Prior cardiac workup: see above (will try to get records) last echo 1/22: aorta 4.4 cm Recent labs: 7/21: hgb 13.8 tot chol 149 tg 142 hdl 64 ldl 57 Cr 0.88 gfr 73 tsh 3.08 a1c 5.3   EKG: SR 68. ant Q waves.   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 Currently post menopause Sx hx: none Fam hx: B: CAD/MI/SCD Social hx: lives in [place] with  and son. works as  for a commercial property. quit TOB 2016 (40 pk yr hx. daily wine. marijuana once a week.  Meds: asa lipitor 80 coreg 3.125 zetia 10, vitamins Allergies: nkda

## 2024-01-03 ENCOUNTER — TRANSCRIPTION ENCOUNTER (OUTPATIENT)
Age: 59
End: 2024-01-03

## 2024-01-05 ENCOUNTER — APPOINTMENT (OUTPATIENT)
Dept: CARDIOLOGY | Facility: CLINIC | Age: 59
End: 2024-01-05
Payer: COMMERCIAL

## 2024-01-05 PROCEDURE — 93306 TTE W/DOPPLER COMPLETE: CPT

## 2024-01-11 RX ORDER — OMEPRAZOLE 40 MG/1
40 CAPSULE, DELAYED RELEASE ORAL
Qty: 90 | Refills: 1 | Status: ACTIVE | COMMUNITY
Start: 2023-12-14 | End: 1900-01-01

## 2024-01-21 LAB
25(OH)D3 SERPL-MCNC: 60.2 NG/ML
ALBUMIN SERPL ELPH-MCNC: 4.6 G/DL
ALP BLD-CCNC: 60 U/L
ALT SERPL-CCNC: 86 U/L
ANION GAP SERPL CALC-SCNC: 10 MMOL/L
APPEARANCE: CLEAR
AST SERPL-CCNC: 74 U/L
BACTERIA: NEGATIVE /HPF
BASOPHILS # BLD AUTO: 0.06 K/UL
BASOPHILS NFR BLD AUTO: 1 %
BILIRUB SERPL-MCNC: 0.4 MG/DL
BILIRUBIN URINE: NEGATIVE
BLOOD URINE: NEGATIVE
BUN SERPL-MCNC: 18 MG/DL
CALCIUM SERPL-MCNC: 10.3 MG/DL
CAST: 0 /LPF
CHLORIDE SERPL-SCNC: 100 MMOL/L
CHOLEST SERPL-MCNC: 231 MG/DL
CO2 SERPL-SCNC: 28 MMOL/L
COLOR: YELLOW
CREAT SERPL-MCNC: 0.85 MG/DL
CREAT SPEC-SCNC: 44 MG/DL
EGFR: 78 ML/MIN/1.73M2
EOSINOPHIL # BLD AUTO: 0.13 K/UL
EOSINOPHIL NFR BLD AUTO: 2.2 %
EPITHELIAL CELLS: 1 /HPF
ESTIMATED AVERAGE GLUCOSE: 114 MG/DL
GLUCOSE QUALITATIVE U: NEGATIVE MG/DL
GLUCOSE SERPL-MCNC: 107 MG/DL
HBA1C MFR BLD HPLC: 5.6 %
HCT VFR BLD CALC: 42.9 %
HDLC SERPL-MCNC: 51 MG/DL
HGB BLD-MCNC: 14.2 G/DL
IMM GRANULOCYTES NFR BLD AUTO: 0.3 %
IRON SATN MFR SERPL: 37 %
IRON SERPL-MCNC: 111 UG/DL
KETONES URINE: NEGATIVE MG/DL
LDLC SERPL CALC-MCNC: 137 MG/DL
LEUKOCYTE ESTERASE URINE: NEGATIVE
LYMPHOCYTES # BLD AUTO: 1.72 K/UL
LYMPHOCYTES NFR BLD AUTO: 28.9 %
MAGNESIUM SERPL-MCNC: 2.2 MG/DL
MAN DIFF?: NORMAL
MCHC RBC-ENTMCNC: 31.4 PG
MCHC RBC-ENTMCNC: 33.1 GM/DL
MCV RBC AUTO: 94.9 FL
MICROALBUMIN 24H UR DL<=1MG/L-MCNC: 4.6 MG/DL
MICROALBUMIN/CREAT 24H UR-RTO: 106 MG/G
MICROSCOPIC-UA: NORMAL
MONOCYTES # BLD AUTO: 0.69 K/UL
MONOCYTES NFR BLD AUTO: 11.6 %
NEUTROPHILS # BLD AUTO: 3.34 K/UL
NEUTROPHILS NFR BLD AUTO: 56 %
NITRITE URINE: NEGATIVE
NONHDLC SERPL-MCNC: 179 MG/DL
PH URINE: 6.5
PLATELET # BLD AUTO: 343 K/UL
POTASSIUM SERPL-SCNC: 5.2 MMOL/L
PROT SERPL-MCNC: 7.3 G/DL
PROTEIN URINE: NEGATIVE MG/DL
RBC # BLD: 4.52 M/UL
RBC # FLD: 14 %
RED BLOOD CELLS URINE: 3 /HPF
SODIUM SERPL-SCNC: 138 MMOL/L
SPECIFIC GRAVITY URINE: 1.01
TIBC SERPL-MCNC: 303 UG/DL
TRIGL SERPL-MCNC: 235 MG/DL
TSH SERPL-ACNC: 1.6 UIU/ML
UIBC SERPL-MCNC: 193 UG/DL
UROBILINOGEN URINE: 0.2 MG/DL
WBC # FLD AUTO: 5.96 K/UL
WHITE BLOOD CELLS URINE: 0 /HPF

## 2024-02-29 ENCOUNTER — APPOINTMENT (OUTPATIENT)
Dept: INTERNAL MEDICINE | Facility: CLINIC | Age: 59
End: 2024-02-29
Payer: COMMERCIAL

## 2024-02-29 ENCOUNTER — MED ADMIN CHARGE (OUTPATIENT)
Age: 59
End: 2024-02-29

## 2024-02-29 DIAGNOSIS — Z23 ENCOUNTER FOR IMMUNIZATION: ICD-10-CM

## 2024-02-29 PROCEDURE — 90471 IMMUNIZATION ADMIN: CPT

## 2024-02-29 PROCEDURE — 90750 HZV VACC RECOMBINANT IM: CPT

## 2024-04-19 ENCOUNTER — NON-APPOINTMENT (OUTPATIENT)
Age: 59
End: 2024-04-19

## 2024-05-13 ENCOUNTER — NON-APPOINTMENT (OUTPATIENT)
Age: 59
End: 2024-05-13

## 2024-05-22 ENCOUNTER — RX RENEWAL (OUTPATIENT)
Age: 59
End: 2024-05-22

## 2024-05-22 RX ORDER — EZETIMIBE 10 MG/1
10 TABLET ORAL
Qty: 90 | Refills: 1 | Status: ACTIVE | COMMUNITY
Start: 2023-01-30 | End: 1900-01-01

## 2024-05-22 RX ORDER — ATORVASTATIN CALCIUM 80 MG/1
80 TABLET, FILM COATED ORAL
Qty: 90 | Refills: 1 | Status: ACTIVE | COMMUNITY
Start: 2023-01-30 | End: 1900-01-01

## 2024-06-07 ENCOUNTER — APPOINTMENT (OUTPATIENT)
Dept: INTERNAL MEDICINE | Facility: CLINIC | Age: 59
End: 2024-06-07

## 2024-07-09 ENCOUNTER — APPOINTMENT (OUTPATIENT)
Dept: CARDIOLOGY | Facility: CLINIC | Age: 59
End: 2024-07-09

## 2024-08-05 NOTE — HISTORY OF PRESENT ILLNESS
[FreeTextEntry1] : 58F with CAD/MI/Cardiac arrest, PCI to LAD, aortic root 4.4 cm presents for f/u. PMD: Dr Jerrica Eckert  prev cardiologist: Dr Ambrose fisher [place], now out of network  previously, pt seen in cardiology for an initial eval 7/21 following an episode 9/24/20, was experiencing CP, states she collapsed at home, doesnAA?A't remember anything, states she was in the ICU at [facility], intubated for 6 days in the CCU, states she received multiple stents, 2 to the LAD. states the next thing she knows she was being discharged from the hospital. pt reported previous normal echos at outpt primary cards (most recent 1/21) 12/2021, at that time feeling well. repeat tte 1/2022, showing normal LV systolic function. normal RV. aortic root 4.4 cm. 8/22, feeling well. repeat tte showing aorta unchanged at 4.4 cm. 2/23, feeling well. exercising daily. pt last seen 12/23, feeling well  pt now presents for follow up. today,               pt overall feeling well, no issues. walking daily 2 miles, without symptoms. denies cp or sob at rest or on exertion. no recent hospitalizations. pt denies palpitations, dizziness, syncope, LE edema, orthopnea.  needs tte, AAA study. ready to scheule. still smoking marijuana occasionally checks bp at home, states t has always been fine.  Exercise: walking daily Diet: overall tries to eat healthy. Prior cardiac workup: see above (will try to get records) last echo 1/22: aorta 4.4 cm Recent labs: 7/21: hgb 13.8 tot chol 149 tg 142 hdl 64 ldl 57 Cr 0.88 gfr 73 tsh 3.08 a1c 5.3  EKG: SR 68. ant Q waves.  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 Currently post menopause Sx hx: none Fam hx: B: CAD/MI/SCD Social hx: lives in [place] with  and son. works as  for a commercial property. quit TOB 2016 (40 pk yr hx. daily wine. marijuana once a week. Meds: asa lipitor 80 coreg 3.125 zetia 10, vitamins Allergies: nkda

## 2024-08-13 ENCOUNTER — NON-APPOINTMENT (OUTPATIENT)
Age: 59
End: 2024-08-13

## 2024-08-13 ENCOUNTER — APPOINTMENT (OUTPATIENT)
Dept: CARDIOLOGY | Facility: CLINIC | Age: 59
End: 2024-08-13
Payer: COMMERCIAL

## 2024-08-13 VITALS
WEIGHT: 190 LBS | HEIGHT: 64 IN | DIASTOLIC BLOOD PRESSURE: 70 MMHG | TEMPERATURE: 97.4 F | BODY MASS INDEX: 32.44 KG/M2 | SYSTOLIC BLOOD PRESSURE: 125 MMHG | OXYGEN SATURATION: 98 % | HEART RATE: 56 BPM | RESPIRATION RATE: 16 BRPM

## 2024-08-13 DIAGNOSIS — E78.2 MIXED HYPERLIPIDEMIA: ICD-10-CM

## 2024-08-13 DIAGNOSIS — I77.810 THORACIC AORTIC ECTASIA: ICD-10-CM

## 2024-08-13 DIAGNOSIS — I25.10 ATHEROSCLEROTIC HEART DISEASE OF NATIVE CORONARY ARTERY W/OUT ANGINA PECTORIS: ICD-10-CM

## 2024-08-13 DIAGNOSIS — I10 ESSENTIAL (PRIMARY) HYPERTENSION: ICD-10-CM

## 2024-08-13 DIAGNOSIS — Z98.61 ATHEROSCLEROTIC HEART DISEASE OF NATIVE CORONARY ARTERY W/OUT ANGINA PECTORIS: ICD-10-CM

## 2024-08-13 PROCEDURE — 93000 ELECTROCARDIOGRAM COMPLETE: CPT

## 2024-08-13 PROCEDURE — 99215 OFFICE O/P EST HI 40 MIN: CPT | Mod: 25

## 2024-08-13 PROCEDURE — 99406 BEHAV CHNG SMOKING 3-10 MIN: CPT

## 2024-08-13 PROCEDURE — G2211 COMPLEX E/M VISIT ADD ON: CPT | Mod: NC

## 2024-08-13 NOTE — HISTORY OF PRESENT ILLNESS
[FreeTextEntry1] : 58F with CAD/MI/Cardiac arrest, PCI to LAD, aortic root 4.4 cm presents for f/u. PMD: Dr Jerrica Eckert  prev cardiologist: Dr Ambrose fisher [place], now out of network  previously, pt seen in cardiology for an initial eval 7/21 following an episode 9/24/20, was experiencing CP, states she collapsed at home, doesn't remember anything, states she was in the ICU at [facility], intubated for 6 days in the CCU, states she received multiple stents, 2 to the LAD. states the next thing she knows she was being discharged from the hospital. pt reported previous normal echos at outpt primary cards (most recent 1/21) 12/2021, at that time feeling well. repeat tte 1/2022, showing normal LV systolic function. normal RV. aortic root 4.4 cm. 8/22, feeling well. repeat tte showing aorta unchanged at 4.4 cm. 2/23, feeling well. exercising daily. pt last seen 12/23, feeling well, tte 1/24 showing aortic root unchanged at 4.4 cm  pt now presents for follow up. today,  pt overall feeling well, no issues. walking daily 2 miles, without symptoms. denies cp or sob at rest or on exertion. no recent hospitalizations. pt denies palpitations, dizziness, syncope, LE edema, orthopnea.  still smoking marijuana, for stress.  Exercise: walking daily Diet: overall tries to eat healthy. Prior cardiac workup: see above (will try to get records) last echo 1/22: aorta 4.4 cm Recent labs: 7/21: hgb 13.8 tot chol 149 tg 142 hdl 64 ldl 57 Cr 0.88 gfr 73 tsh 3.08 a1c 5.3  EKG: SR 68. ant Q waves.  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 Currently post menopause Sx hx: none Fam hx: B: CAD/MI/SCD Social hx: lives in [place] with  and son. works as  for a commercial property. quit TOB 2016 (40 pk yr hx. daily wine. marijuana once a week) Meds: asa lipitor 80 coreg 3.125 zetia 10, vitamins Allergies: nkda

## 2024-08-13 NOTE — DISCUSSION/SUMMARY
[FreeTextEntry1] : 58F with CAD/MI/Cardiac arrest/PCI, dilated aorta presents for f/u.   1. CAD -no cp or sob at rest or on exertion -cont asa, statin, coreg -pt feeling well, without symptoms at rest or on exertion, euvolemic on exam   2. dilated aorta -unchanged dimensions at 4.4 cm at last echo 1/24 -will repeat echo yearly -bp controlled -pt advised to stop smoking marijuana  f/u 6 months unless requires sooner.   40 min spent on complete encounter  [EKG obtained to assist in diagnosis and management of assessed problem(s)] : EKG obtained to assist in diagnosis and management of assessed problem(s)

## 2024-08-19 ENCOUNTER — RX RENEWAL (OUTPATIENT)
Age: 59
End: 2024-08-19

## 2024-08-21 ENCOUNTER — RX RENEWAL (OUTPATIENT)
Age: 59
End: 2024-08-21

## 2024-10-30 ENCOUNTER — APPOINTMENT (OUTPATIENT)
Dept: INTERNAL MEDICINE | Facility: CLINIC | Age: 59
End: 2024-10-30
Payer: COMMERCIAL

## 2024-10-30 VITALS
TEMPERATURE: 97.9 F | DIASTOLIC BLOOD PRESSURE: 87 MMHG | BODY MASS INDEX: 32.95 KG/M2 | SYSTOLIC BLOOD PRESSURE: 133 MMHG | WEIGHT: 193 LBS | HEIGHT: 64 IN | HEART RATE: 61 BPM | OXYGEN SATURATION: 98 %

## 2024-10-30 DIAGNOSIS — G25.2 OTHER SPECIFIED FORMS OF TREMOR: ICD-10-CM

## 2024-10-30 DIAGNOSIS — Z23 ENCOUNTER FOR IMMUNIZATION: ICD-10-CM

## 2024-10-30 DIAGNOSIS — E66.09 OTHER OBESITY DUE TO EXCESS CALORIES: ICD-10-CM

## 2024-10-30 DIAGNOSIS — F41.1 GENERALIZED ANXIETY DISORDER: ICD-10-CM

## 2024-10-30 DIAGNOSIS — I25.10 ATHEROSCLEROTIC HEART DISEASE OF NATIVE CORONARY ARTERY W/OUT ANGINA PECTORIS: ICD-10-CM

## 2024-10-30 DIAGNOSIS — I10 ESSENTIAL (PRIMARY) HYPERTENSION: ICD-10-CM

## 2024-10-30 DIAGNOSIS — Z00.00 ENCOUNTER FOR GENERAL ADULT MEDICAL EXAMINATION W/OUT ABNORMAL FINDINGS: ICD-10-CM

## 2024-10-30 DIAGNOSIS — Z98.61 ATHEROSCLEROTIC HEART DISEASE OF NATIVE CORONARY ARTERY W/OUT ANGINA PECTORIS: ICD-10-CM

## 2024-10-30 DIAGNOSIS — K64.4 RESIDUAL HEMORRHOIDAL SKIN TAGS: ICD-10-CM

## 2024-10-30 DIAGNOSIS — Z86.16 PERSONAL HISTORY OF COVID-19: ICD-10-CM

## 2024-10-30 DIAGNOSIS — Z87.891 PERSONAL HISTORY OF NICOTINE DEPENDENCE: ICD-10-CM

## 2024-10-30 PROCEDURE — G0444 DEPRESSION SCREEN ANNUAL: CPT | Mod: 59

## 2024-10-30 PROCEDURE — 99396 PREV VISIT EST AGE 40-64: CPT | Mod: 25

## 2024-10-31 LAB
25(OH)D3 SERPL-MCNC: 69.1 NG/ML
ALBUMIN SERPL ELPH-MCNC: 4.3 G/DL
ALP BLD-CCNC: 52 U/L
ALT SERPL-CCNC: 35 U/L
ANION GAP SERPL CALC-SCNC: 11 MMOL/L
APPEARANCE: CLEAR
AST SERPL-CCNC: 35 U/L
BACTERIA: NEGATIVE /HPF
BASOPHILS # BLD AUTO: 0.11 K/UL
BASOPHILS NFR BLD AUTO: 1.6 %
BILIRUB SERPL-MCNC: 0.5 MG/DL
BILIRUBIN URINE: NEGATIVE
BLOOD URINE: NEGATIVE
BUN SERPL-MCNC: 15 MG/DL
CALCIUM SERPL-MCNC: 10 MG/DL
CAST: 0 /LPF
CHLORIDE SERPL-SCNC: 101 MMOL/L
CHOLEST SERPL-MCNC: 163 MG/DL
CO2 SERPL-SCNC: 27 MMOL/L
COLOR: YELLOW
CREAT SERPL-MCNC: 1.01 MG/DL
EGFR: 63 ML/MIN/1.73M2
EOSINOPHIL # BLD AUTO: 0.32 K/UL
EOSINOPHIL NFR BLD AUTO: 4.7 %
EPITHELIAL CELLS: 0 /HPF
ESTIMATED AVERAGE GLUCOSE: 114 MG/DL
GLUCOSE QUALITATIVE U: NEGATIVE MG/DL
GLUCOSE SERPL-MCNC: 108 MG/DL
HBA1C MFR BLD HPLC: 5.6 %
HCT VFR BLD CALC: 42.6 %
HDLC SERPL-MCNC: 65 MG/DL
HGB BLD-MCNC: 14.2 G/DL
IMM GRANULOCYTES NFR BLD AUTO: 0.1 %
KETONES URINE: NEGATIVE MG/DL
LDLC SERPL CALC-MCNC: 74 MG/DL
LEUKOCYTE ESTERASE URINE: NEGATIVE
LYMPHOCYTES # BLD AUTO: 1.84 K/UL
LYMPHOCYTES NFR BLD AUTO: 27.3 %
MAGNESIUM SERPL-MCNC: 2.1 MG/DL
MAN DIFF?: NORMAL
MCHC RBC-ENTMCNC: 31 PG
MCHC RBC-ENTMCNC: 33.3 G/DL
MCV RBC AUTO: 93 FL
MICROSCOPIC-UA: NORMAL
MONOCYTES # BLD AUTO: 0.72 K/UL
MONOCYTES NFR BLD AUTO: 10.7 %
NEUTROPHILS # BLD AUTO: 3.74 K/UL
NEUTROPHILS NFR BLD AUTO: 55.6 %
NITRITE URINE: NEGATIVE
NONHDLC SERPL-MCNC: 99 MG/DL
PH URINE: 7
PLATELET # BLD AUTO: 259 K/UL
POTASSIUM SERPL-SCNC: 4.6 MMOL/L
PROT SERPL-MCNC: 7.1 G/DL
PROTEIN URINE: NEGATIVE MG/DL
RBC # BLD: 4.58 M/UL
RBC # FLD: 14.3 %
RED BLOOD CELLS URINE: 0 /HPF
SODIUM SERPL-SCNC: 139 MMOL/L
SPECIFIC GRAVITY URINE: 1.01
TRIGL SERPL-MCNC: 144 MG/DL
TSH SERPL-ACNC: 2.06 UIU/ML
UROBILINOGEN URINE: 0.2 MG/DL
VIT B12 SERPL-MCNC: >2000 PG/ML
WBC # FLD AUTO: 6.74 K/UL
WHITE BLOOD CELLS URINE: 0 /HPF

## 2024-11-11 ENCOUNTER — RX RENEWAL (OUTPATIENT)
Age: 59
End: 2024-11-11

## 2024-11-19 ENCOUNTER — APPOINTMENT (OUTPATIENT)
Dept: SURGERY | Facility: CLINIC | Age: 59
End: 2024-11-19

## 2024-11-19 VITALS
HEART RATE: 67 BPM | DIASTOLIC BLOOD PRESSURE: 75 MMHG | HEIGHT: 64 IN | TEMPERATURE: 97.3 F | SYSTOLIC BLOOD PRESSURE: 111 MMHG | OXYGEN SATURATION: 98 % | WEIGHT: 193 LBS | BODY MASS INDEX: 32.95 KG/M2

## 2024-11-19 DIAGNOSIS — K64.4 RESIDUAL HEMORRHOIDAL SKIN TAGS: ICD-10-CM

## 2024-11-19 DIAGNOSIS — Z77.22 CONTACT WITH AND (SUSPECTED) EXPOSURE TO ENVIRONMENTAL TOBACCO SMOKE (ACUTE) (CHRONIC): ICD-10-CM

## 2024-11-19 PROCEDURE — 99202 OFFICE O/P NEW SF 15 MIN: CPT

## 2024-11-26 PROBLEM — K64.4 SKIN TAG OF PERIANAL REGION: Status: ACTIVE | Noted: 2024-11-26

## 2025-02-13 ENCOUNTER — RX RENEWAL (OUTPATIENT)
Age: 60
End: 2025-02-13

## 2025-02-18 ENCOUNTER — APPOINTMENT (OUTPATIENT)
Dept: CARDIOLOGY | Facility: CLINIC | Age: 60
End: 2025-02-18
Payer: COMMERCIAL

## 2025-02-18 ENCOUNTER — NON-APPOINTMENT (OUTPATIENT)
Age: 60
End: 2025-02-18

## 2025-02-18 VITALS
TEMPERATURE: 98.5 F | RESPIRATION RATE: 17 BRPM | DIASTOLIC BLOOD PRESSURE: 69 MMHG | BODY MASS INDEX: 32.44 KG/M2 | OXYGEN SATURATION: 96 % | SYSTOLIC BLOOD PRESSURE: 103 MMHG | HEIGHT: 64 IN | HEART RATE: 64 BPM | WEIGHT: 190 LBS

## 2025-02-18 DIAGNOSIS — I77.810 THORACIC AORTIC ECTASIA: ICD-10-CM

## 2025-02-18 DIAGNOSIS — Z98.61 ATHEROSCLEROTIC HEART DISEASE OF NATIVE CORONARY ARTERY W/OUT ANGINA PECTORIS: ICD-10-CM

## 2025-02-18 DIAGNOSIS — I10 ESSENTIAL (PRIMARY) HYPERTENSION: ICD-10-CM

## 2025-02-18 DIAGNOSIS — E78.2 MIXED HYPERLIPIDEMIA: ICD-10-CM

## 2025-02-18 DIAGNOSIS — I25.10 ATHEROSCLEROTIC HEART DISEASE OF NATIVE CORONARY ARTERY W/OUT ANGINA PECTORIS: ICD-10-CM

## 2025-02-18 PROCEDURE — 99215 OFFICE O/P EST HI 40 MIN: CPT | Mod: 25

## 2025-02-18 PROCEDURE — 93000 ELECTROCARDIOGRAM COMPLETE: CPT

## 2025-02-18 PROCEDURE — 93306 TTE W/DOPPLER COMPLETE: CPT

## 2025-03-04 ENCOUNTER — NON-APPOINTMENT (OUTPATIENT)
Age: 60
End: 2025-03-04

## 2025-03-04 ENCOUNTER — APPOINTMENT (OUTPATIENT)
Dept: NEUROLOGY | Facility: CLINIC | Age: 60
End: 2025-03-04
Payer: COMMERCIAL

## 2025-03-04 VITALS
WEIGHT: 190 LBS | HEIGHT: 64 IN | SYSTOLIC BLOOD PRESSURE: 121 MMHG | BODY MASS INDEX: 32.44 KG/M2 | HEART RATE: 65 BPM | DIASTOLIC BLOOD PRESSURE: 83 MMHG

## 2025-03-04 PROCEDURE — 99204 OFFICE O/P NEW MOD 45 MIN: CPT

## 2025-03-21 ENCOUNTER — APPOINTMENT (OUTPATIENT)
Dept: MRI IMAGING | Facility: CLINIC | Age: 60
End: 2025-03-21
Payer: COMMERCIAL

## 2025-03-21 ENCOUNTER — NON-APPOINTMENT (OUTPATIENT)
Age: 60
End: 2025-03-21

## 2025-03-21 PROCEDURE — 70551 MRI BRAIN STEM W/O DYE: CPT

## 2025-04-28 ENCOUNTER — NON-APPOINTMENT (OUTPATIENT)
Age: 60
End: 2025-04-28

## 2025-05-01 ENCOUNTER — APPOINTMENT (OUTPATIENT)
Dept: CARDIOLOGY | Facility: CLINIC | Age: 60
End: 2025-05-01
Payer: COMMERCIAL

## 2025-05-01 ENCOUNTER — NON-APPOINTMENT (OUTPATIENT)
Age: 60
End: 2025-05-01

## 2025-05-01 VITALS
HEIGHT: 64 IN | SYSTOLIC BLOOD PRESSURE: 108 MMHG | WEIGHT: 188 LBS | BODY MASS INDEX: 32.1 KG/M2 | OXYGEN SATURATION: 97 % | RESPIRATION RATE: 17 BRPM | HEART RATE: 67 BPM | TEMPERATURE: 98.4 F | DIASTOLIC BLOOD PRESSURE: 74 MMHG

## 2025-05-01 DIAGNOSIS — E78.2 MIXED HYPERLIPIDEMIA: ICD-10-CM

## 2025-05-01 DIAGNOSIS — I77.810 THORACIC AORTIC ECTASIA: ICD-10-CM

## 2025-05-01 DIAGNOSIS — I10 ESSENTIAL (PRIMARY) HYPERTENSION: ICD-10-CM

## 2025-05-01 DIAGNOSIS — I25.10 ATHEROSCLEROTIC HEART DISEASE OF NATIVE CORONARY ARTERY W/OUT ANGINA PECTORIS: ICD-10-CM

## 2025-05-01 PROCEDURE — 99215 OFFICE O/P EST HI 40 MIN: CPT | Mod: 25

## 2025-05-01 PROCEDURE — 99406 BEHAV CHNG SMOKING 3-10 MIN: CPT

## 2025-05-01 PROCEDURE — 93000 ELECTROCARDIOGRAM COMPLETE: CPT

## 2025-05-07 ENCOUNTER — RX RENEWAL (OUTPATIENT)
Age: 60
End: 2025-05-07

## 2025-05-29 ENCOUNTER — NON-APPOINTMENT (OUTPATIENT)
Age: 60
End: 2025-05-29

## 2025-06-19 PROBLEM — Z23 ENCOUNTER FOR IMMUNIZATION: Status: ACTIVE | Noted: 2023-12-04 | Resolved: 2025-07-03

## 2025-06-23 ENCOUNTER — APPOINTMENT (OUTPATIENT)
Dept: INTERNAL MEDICINE | Facility: CLINIC | Age: 60
End: 2025-06-23
Payer: COMMERCIAL

## 2025-06-23 PROCEDURE — 90471 IMMUNIZATION ADMIN: CPT

## 2025-06-23 PROCEDURE — 90715 TDAP VACCINE 7 YRS/> IM: CPT

## 2025-07-17 ENCOUNTER — TRANSCRIPTION ENCOUNTER (OUTPATIENT)
Age: 60
End: 2025-07-17

## 2025-08-07 ENCOUNTER — APPOINTMENT (OUTPATIENT)
Dept: CARDIOLOGY | Facility: CLINIC | Age: 60
End: 2025-08-07
Payer: COMMERCIAL

## 2025-08-07 ENCOUNTER — RX RENEWAL (OUTPATIENT)
Age: 60
End: 2025-08-07

## 2025-08-07 VITALS
RESPIRATION RATE: 16 BRPM | SYSTOLIC BLOOD PRESSURE: 102 MMHG | OXYGEN SATURATION: 96 % | WEIGHT: 182 LBS | TEMPERATURE: 97.8 F | HEART RATE: 63 BPM | BODY MASS INDEX: 31.07 KG/M2 | DIASTOLIC BLOOD PRESSURE: 76 MMHG | HEIGHT: 64 IN

## 2025-08-07 DIAGNOSIS — Z98.61 ATHEROSCLEROTIC HEART DISEASE OF NATIVE CORONARY ARTERY W/OUT ANGINA PECTORIS: ICD-10-CM

## 2025-08-07 DIAGNOSIS — E78.2 MIXED HYPERLIPIDEMIA: ICD-10-CM

## 2025-08-07 DIAGNOSIS — I77.810 THORACIC AORTIC ECTASIA: ICD-10-CM

## 2025-08-07 DIAGNOSIS — I10 ESSENTIAL (PRIMARY) HYPERTENSION: ICD-10-CM

## 2025-08-07 DIAGNOSIS — I25.10 ATHEROSCLEROTIC HEART DISEASE OF NATIVE CORONARY ARTERY W/OUT ANGINA PECTORIS: ICD-10-CM

## 2025-08-07 PROCEDURE — 99215 OFFICE O/P EST HI 40 MIN: CPT | Mod: 25

## 2025-08-07 PROCEDURE — 93000 ELECTROCARDIOGRAM COMPLETE: CPT
